# Patient Record
Sex: FEMALE | Race: WHITE | Employment: UNEMPLOYED | URBAN - METROPOLITAN AREA
[De-identification: names, ages, dates, MRNs, and addresses within clinical notes are randomized per-mention and may not be internally consistent; named-entity substitution may affect disease eponyms.]

---

## 2017-05-08 ENCOUNTER — TELEPHONE (OUTPATIENT)
Dept: OBSTETRICS AND GYNECOLOGY | Facility: CLINIC | Age: 47
End: 2017-05-08

## 2017-05-08 DIAGNOSIS — Z12.31 ENCOUNTER FOR SCREENING MAMMOGRAM FOR BREAST CANCER: Primary | ICD-10-CM

## 2017-05-08 NOTE — TELEPHONE ENCOUNTER
----- Message from Zunilda Voss sent at 5/8/2017  1:08 PM CDT -----  Contact: pt- [phone]  Pt would like a wellness exam and Mammo on the same day

## 2017-05-08 NOTE — TELEPHONE ENCOUNTER
appt made on 05/18/2017 with you for annual, she would like her mammo done the same day, needs mammo ordered please tdf

## 2017-05-17 ENCOUNTER — TELEPHONE (OUTPATIENT)
Dept: OBSTETRICS AND GYNECOLOGY | Facility: CLINIC | Age: 47
End: 2017-05-17

## 2017-05-17 NOTE — TELEPHONE ENCOUNTER
----- Message from Meka Carey sent at 5/17/2017  9:14 AM CDT -----  Contact: pt  Pt is returning missed call - please call again

## 2017-12-18 ENCOUNTER — TELEPHONE (OUTPATIENT)
Dept: OBSTETRICS AND GYNECOLOGY | Facility: CLINIC | Age: 47
End: 2017-12-18

## 2017-12-18 NOTE — TELEPHONE ENCOUNTER
Left a message for the patient informing her that the midwives are not seeing any GYN patient's so she can contact the appointment desk at [phone] and they can get her scheduled with one of the Doctor's or NP.

## 2017-12-18 NOTE — TELEPHONE ENCOUNTER
----- Message from Ivelisse Musa sent at 12/18/2017  1:02 PM CST -----  Contact: Pt  Pt called and stated she needed to speak to the nurse. She stated that she needs to be squeezed in to the schedule as soon as possible for her annual  pap smear.  She can be reached at [phone].    Thanks,  TF

## 2018-04-09 ENCOUNTER — OFFICE VISIT (OUTPATIENT)
Dept: OBSTETRICS AND GYNECOLOGY | Facility: CLINIC | Age: 48
End: 2018-04-09
Payer: MEDICAID

## 2018-04-09 ENCOUNTER — TELEPHONE (OUTPATIENT)
Dept: OBSTETRICS AND GYNECOLOGY | Facility: CLINIC | Age: 48
End: 2018-04-09

## 2018-04-09 VITALS
WEIGHT: 155.88 LBS | SYSTOLIC BLOOD PRESSURE: 102 MMHG | BODY MASS INDEX: 25.05 KG/M2 | HEIGHT: 66 IN | DIASTOLIC BLOOD PRESSURE: 78 MMHG

## 2018-04-09 DIAGNOSIS — N89.8 VAGINAL LESION: Primary | ICD-10-CM

## 2018-04-09 PROCEDURE — 99213 OFFICE O/P EST LOW 20 MIN: CPT | Mod: PBBFAC | Performed by: NURSE PRACTITIONER

## 2018-04-09 PROCEDURE — 87529 HSV DNA AMP PROBE: CPT

## 2018-04-09 PROCEDURE — 99999 PR PBB SHADOW E&M-EST. PATIENT-LVL III: CPT | Mod: PBBFAC,,, | Performed by: NURSE PRACTITIONER

## 2018-04-09 PROCEDURE — 99203 OFFICE O/P NEW LOW 30 MIN: CPT | Mod: S$PBB,,, | Performed by: NURSE PRACTITIONER

## 2018-04-09 RX ORDER — NYSTATIN AND TRIAMCINOLONE ACETONIDE 100000; 1 [USP'U]/G; MG/G
CREAM TOPICAL
Qty: 30 G | Refills: 1 | Status: SHIPPED | OUTPATIENT
Start: 2018-04-09 | End: 2018-05-21 | Stop reason: ALTCHOICE

## 2018-04-09 RX ORDER — ERGOCALCIFEROL 1.25 MG/1
CAPSULE ORAL DAILY
COMMUNITY
Start: 2017-10-20 | End: 2018-10-20

## 2018-04-09 NOTE — TELEPHONE ENCOUNTER
----- Message from Carrie Leonard sent at 4/9/2018  9:14 AM CDT -----  Contact: pt  States she would like to see Ms Toya Calvert tomorrow afternoon. Nothing coming up on the schedule. Please call pt at [phone]. Thank you

## 2018-04-09 NOTE — PROGRESS NOTES
"CC: Vaginal irritation    Elysia Cadena is a 47 y.o. female  presents for vaginal irritation. Patient reports that she has had vaginal irritation and burning for 7 weeks.    History reviewed. No pertinent past medical history.  Past Surgical History:   Procedure Laterality Date     SECTION       Social History     Social History    Marital status:      Spouse name: N/A    Number of children: N/A    Years of education: N/A     Occupational History    Not on file.     Social History Main Topics    Smoking status: Current Every Day Smoker     Types: Cigarettes    Smokeless tobacco: Current User      Comment: a pack in a couple days     Alcohol use No    Drug use: No    Sexual activity: Yes     Partners: Male     Other Topics Concern    Not on file     Social History Narrative    No narrative on file     History reviewed. No pertinent family history.  OB History      Para Term  AB Living    1 1 1     1    SAB TAB Ectopic Multiple Live Births                       /78 (BP Location: Right arm, Patient Position: Sitting, BP Method: Medium (Manual))   Ht 5' 6" (1.676 m)   Wt 70.7 kg (155 lb 13.8 oz)   LMP 2018   BMI 25.16 kg/m²       ROS:  GENERAL: Denies weight gain or weight loss. Feeling well overall.   CHEST: Denies chest pain or shortness of breath.   CARDIOVASCULAR: Denies palpitations or left sided chest pain.   ABDOMEN: No abdominal pain, constipation, diarrhea, nausea, vomiting or rectal bleeding.   URINARY: No frequency, dysuria, hematuria, or burning on urination.  REPRODUCTIVE: See HPI.       PHYSICAL EXAM:  APPEARANCE: Well nourished, well developed, in no acute distress.  PELVIC: External genitalia, erythema and small blisters clitoral mittal. Vagina moist and well rugated without lesions or discharge.      1. Vaginal lesion  Herpes simplex virus culture     PLAN:  HSV cx  Mycolog cream    Patient was counseled today on A.C.S. Pap guidelines and " recommendations for yearly pelvic exams, mammograms and monthly self breast exams; to see her PCP for other health maintenance.

## 2018-04-10 DIAGNOSIS — N89.8 VAGINAL LESION: Primary | ICD-10-CM

## 2018-04-11 ENCOUNTER — PATIENT MESSAGE (OUTPATIENT)
Dept: OBSTETRICS AND GYNECOLOGY | Facility: CLINIC | Age: 48
End: 2018-04-11

## 2018-04-12 LAB
HSV1 DNA SPEC QL NAA+PROBE: POSITIVE
HSV2 DNA SPEC QL NAA+PROBE: NEGATIVE
SPECIMEN SOURCE: ABNORMAL

## 2018-04-13 ENCOUNTER — OFFICE VISIT (OUTPATIENT)
Dept: OBSTETRICS AND GYNECOLOGY | Facility: CLINIC | Age: 48
End: 2018-04-13
Payer: MEDICAID

## 2018-04-13 ENCOUNTER — TELEPHONE (OUTPATIENT)
Dept: OBSTETRICS AND GYNECOLOGY | Facility: CLINIC | Age: 48
End: 2018-04-13

## 2018-04-13 VITALS
HEIGHT: 66 IN | SYSTOLIC BLOOD PRESSURE: 106 MMHG | DIASTOLIC BLOOD PRESSURE: 72 MMHG | BODY MASS INDEX: 25.12 KG/M2 | WEIGHT: 156.31 LBS

## 2018-04-13 DIAGNOSIS — L02.419 CELLULITIS AND ABSCESS OF LEG: ICD-10-CM

## 2018-04-13 DIAGNOSIS — N89.8 VAGINAL LESION: Primary | ICD-10-CM

## 2018-04-13 DIAGNOSIS — L03.119 CELLULITIS AND ABSCESS OF LEG: ICD-10-CM

## 2018-04-13 PROCEDURE — 99213 OFFICE O/P EST LOW 20 MIN: CPT | Mod: S$PBB,,, | Performed by: NURSE PRACTITIONER

## 2018-04-13 PROCEDURE — 99999 PR PBB SHADOW E&M-EST. PATIENT-LVL III: CPT | Mod: PBBFAC,,, | Performed by: NURSE PRACTITIONER

## 2018-04-13 PROCEDURE — 99213 OFFICE O/P EST LOW 20 MIN: CPT | Mod: PBBFAC | Performed by: NURSE PRACTITIONER

## 2018-04-13 RX ORDER — MUPIROCIN CALCIUM 20 MG/G
CREAM TOPICAL
Qty: 30 G | Refills: 0 | Status: SHIPPED | OUTPATIENT
Start: 2018-04-13 | End: 2018-05-21 | Stop reason: ALTCHOICE

## 2018-04-13 RX ORDER — VALACYCLOVIR HYDROCHLORIDE 500 MG/1
TABLET, FILM COATED ORAL
Qty: 30 TABLET | Refills: 12 | Status: SHIPPED | OUTPATIENT
Start: 2018-04-13

## 2018-04-13 RX ORDER — SULFAMETHOXAZOLE AND TRIMETHOPRIM 800; 160 MG/1; MG/1
1 TABLET ORAL 2 TIMES DAILY
Qty: 10 TABLET | Refills: 0 | Status: SHIPPED | OUTPATIENT
Start: 2018-04-13 | End: 2018-04-18

## 2018-04-13 RX ORDER — VALACYCLOVIR HYDROCHLORIDE 1 G/1
1000 TABLET, FILM COATED ORAL 2 TIMES DAILY
Qty: 20 TABLET | Refills: 0 | Status: SHIPPED | OUTPATIENT
Start: 2018-04-13 | End: 2018-04-23

## 2018-04-13 NOTE — PROGRESS NOTES
"CC: Discuss HSV cx results    Elysia Cadena is a 47 y.o. female  presents to discuss HSV results. Patient was seen for vaginal irritation and HSV cx positive for HSV I.  LMP: Patient's last menstrual period was 2018.. Patient reports that she has a rash on the back of her leg and " is concerned that it is from HSV".    History reviewed. No pertinent past medical history.  Past Surgical History:   Procedure Laterality Date     SECTION       Social History     Social History    Marital status:      Spouse name: N/A    Number of children: N/A    Years of education: N/A     Occupational History    Not on file.     Social History Main Topics    Smoking status: Current Every Day Smoker     Types: Cigarettes    Smokeless tobacco: Current User      Comment: a pack in a couple days     Alcohol use No    Drug use: No    Sexual activity: Yes     Partners: Male     Other Topics Concern    Not on file     Social History Narrative    No narrative on file     History reviewed. No pertinent family history.  OB History      Para Term  AB Living    1 1 1     1    SAB TAB Ectopic Multiple Live Births                       /72   Ht 5' 6" (1.676 m)   Wt 70.9 kg (156 lb 4.9 oz)   LMP 2018   BMI 25.23 kg/m²       ROS:  GENERAL: Denies weight gain or weight loss. Feeling well overall.   SKIN: HPI  HEAD: Denies head injury or headache.   NODES: Denies enlarged lymph nodes.   CHEST: Denies chest pain or shortness of breath.   CARDIOVASCULAR: Denies palpitations or left sided chest pain.   ABDOMEN: No abdominal pain, constipation, diarrhea, nausea, vomiting or rectal bleeding.   URINARY: No frequency, dysuria, hematuria, or burning on urination.  REPRODUCTIVE: See HPI.   BREASTS: The patient performs breast self-examination and denies pain, lumps, or nipple discharge.   HEMATOLOGIC: No easy bruisability or excessive bleeding.   MUSCULOSKELETAL: Denies joint pain or swelling. "   NEUROLOGIC: Denies syncope or weakness.   PSYCHIATRIC: +anxiety.    PHYSICAL EXAM:  APPEARANCE: Well nourished, well developed, in no acute distress.  AFFECT: WNL, alert and oriented x 3  SKIN: Small round indurated cellulitis back right leg.       1. Vaginal lesion     2. Cellulitis and abscess of leg      PLAN:  I spent 20 minutes with this patient explaining HSV I and II and treatment options. Will start suppressive medication.  Bactrim and Bactroban for cellulitis.         Patient was counseled today on A.C.S. Pap guidelines and recommendations for yearly pelvic exams, mammograms and monthly self breast exams; to see her PCP for other health maintenance.

## 2018-04-13 NOTE — TELEPHONE ENCOUNTER
Spoke with pt. Let pt know that I would let the provider know about the prescription and I would call her back once we can call something else in for her.

## 2018-04-13 NOTE — TELEPHONE ENCOUNTER
----- Message from Ashley Solis sent at 4/13/2018  2:36 PM CDT -----  Contact: Pt  Pt request call back states her medication is $400.00. States she needs something else called in. .[phone] (home)

## 2018-04-13 NOTE — TELEPHONE ENCOUNTER
Pt states she has a rash on inner thigh. Demands an appt, scheduled for 4/13 1:45 PM. Patient verbalized understanding

## 2018-04-13 NOTE — TELEPHONE ENCOUNTER
Spoke with patient. Advised pt to use antibiotics and not the cream. Also, advised the pt to follow up with her PCP if the antibiotics do not work. Patient verbalized understanding.

## 2018-04-13 NOTE — TELEPHONE ENCOUNTER
----- Message from Court Hernandez sent at 4/13/2018  9:11 AM CDT -----  Contact: patient  States she need to be seen today for a rash on her leg. Please call patient today [facility] @ [phone]. Thanks, melissa

## 2018-04-13 NOTE — TELEPHONE ENCOUNTER
----- Message from Carla Thomas NP sent at 4/13/2018  3:13 PM CDT -----  Contact: Pt  Do not use cream, use antibiotics. Needs to see PCP if no resolve to treatment  ----- Message -----  From: Raegan Viera MA  Sent: 4/13/2018   2:43 PM  To: Carla Thomas NP        ----- Message -----  From: Ashley Solis  Sent: 4/13/2018   2:36 PM  To: Martha Aguirre Staff    Pt request call back states her medication is $400.00. States she needs something else called in. .[phone] (home)

## 2018-05-17 ENCOUNTER — TELEPHONE (OUTPATIENT)
Dept: OBSTETRICS AND GYNECOLOGY | Facility: CLINIC | Age: 48
End: 2018-05-17

## 2018-05-17 NOTE — TELEPHONE ENCOUNTER
Pt called in requesting an appt specifically with Carla Thomas, in regards to experiencing the same symptoms from her last visit on 4/13/18. Informed pt that provider will not be back into the office until Monday. Pt agreed to be scheduled for Monday.

## 2018-05-17 NOTE — TELEPHONE ENCOUNTER
Pt had questions in regards to pt's  concern with herpes exposure. Advised pt that her  can go to his pcp for bloodwork. Patient verbalized understanding

## 2018-05-17 NOTE — TELEPHONE ENCOUNTER
----- Message from Irene Chavis sent at 5/17/2018  2:07 PM CDT -----  Contact: pt   pt requesting callback. will elaborate(urgent per patient) ..[phone] (home)

## 2018-05-22 ENCOUNTER — OFFICE VISIT (OUTPATIENT)
Dept: OBSTETRICS AND GYNECOLOGY | Facility: CLINIC | Age: 48
End: 2018-05-22
Payer: MEDICAID

## 2018-05-22 VITALS
DIASTOLIC BLOOD PRESSURE: 84 MMHG | BODY MASS INDEX: 24.48 KG/M2 | SYSTOLIC BLOOD PRESSURE: 132 MMHG | HEIGHT: 66 IN | WEIGHT: 152.31 LBS

## 2018-05-22 DIAGNOSIS — B00.9 HSV-2 INFECTION: Primary | ICD-10-CM

## 2018-05-22 PROCEDURE — 99212 OFFICE O/P EST SF 10 MIN: CPT | Mod: PBBFAC | Performed by: NURSE PRACTITIONER

## 2018-05-22 PROCEDURE — 99999 PR PBB SHADOW E&M-EST. PATIENT-LVL II: CPT | Mod: PBBFAC,,, | Performed by: NURSE PRACTITIONER

## 2018-05-22 PROCEDURE — 99213 OFFICE O/P EST LOW 20 MIN: CPT | Mod: S$PBB,,, | Performed by: NURSE PRACTITIONER

## 2018-05-24 NOTE — PROGRESS NOTES
"CC: Review cx results    Elysia Cadena is a 47 y.o. female  presents with her  to review HSV cx results. Patient requesting that " NP discuss her results and the implication of HSV".     History reviewed. No pertinent past medical history.  Past Surgical History:   Procedure Laterality Date     SECTION       Social History     Social History    Marital status:      Spouse name: N/A    Number of children: N/A    Years of education: N/A     Occupational History    Not on file.     Social History Main Topics    Smoking status: Current Every Day Smoker     Packs/day: 0.50     Types: Cigarettes    Smokeless tobacco: Current User      Comment: a pack in a couple days     Alcohol use No    Drug use: No    Sexual activity: Yes     Partners: Male     Other Topics Concern    Not on file     Social History Narrative    No narrative on file     History reviewed. No pertinent family history.  OB History      Para Term  AB Living    1 1 1     1    SAB TAB Ectopic Multiple Live Births                       /84   Ht 5' 6" (1.676 m)   Wt 69.1 kg (152 lb 5.4 oz)   LMP 2018   BMI 24.59 kg/m²       ROS:  GENERAL: Denies weight gain or weight loss. Feeling well overall.   SKIN: Denies rash or lesions.   HEAD: Denies head injury or headache.   NODES: Denies enlarged lymph nodes.   CHEST: Denies chest pain or shortness of breath.   CARDIOVASCULAR: Denies palpitations or left sided chest pain.   ABDOMEN: No abdominal pain, constipation, diarrhea, nausea, vomiting or rectal bleeding.   URINARY: No frequency, dysuria, hematuria, or burning on urination.  REPRODUCTIVE: See HPI.   BREASTS: The patient performs breast self-examination and denies pain, lumps, or nipple discharge.   HEMATOLOGIC: No easy bruisability or excessive bleeding.   MUSCULOSKELETAL: Denies joint pain or swelling.   NEUROLOGIC: Denies syncope or weakness.   PSYCHIATRIC: Denies depression, anxiety or mood " swings.    PHYSICAL EXAM:  Deferred ( see HPI)       PLAN:  I spent 90 minutes with this patient and  explaining HSV I cx results. Patient and  fought the entire visit.

## 2018-05-29 ENCOUNTER — PATIENT MESSAGE (OUTPATIENT)
Dept: OBSTETRICS AND GYNECOLOGY | Facility: CLINIC | Age: 48
End: 2018-05-29

## 2018-05-29 ENCOUNTER — TELEPHONE (OUTPATIENT)
Dept: OBSTETRICS AND GYNECOLOGY | Facility: CLINIC | Age: 48
End: 2018-05-29

## 2018-05-29 DIAGNOSIS — B00.9 HSV INFECTION: Primary | ICD-10-CM

## 2018-05-29 NOTE — TELEPHONE ENCOUNTER
----- Message from Tyler Henriquez sent at 5/29/2018 12:09 PM CDT -----  Contact: Elysia [phone]  Patient wants a nurse to contact her but would not disclose what she wanted to leave a msg about. Patient states that Dr. Thomas knows what she is calling about. Please contact pt @ [phone]

## 2018-05-30 ENCOUNTER — LAB VISIT (OUTPATIENT)
Dept: LAB | Facility: HOSPITAL | Age: 48
End: 2018-05-30
Attending: NURSE PRACTITIONER
Payer: MEDICAID

## 2018-05-30 DIAGNOSIS — B00.9 HSV INFECTION: ICD-10-CM

## 2018-05-30 PROCEDURE — 36415 COLL VENOUS BLD VENIPUNCTURE: CPT

## 2018-05-30 PROCEDURE — 86696 HERPES SIMPLEX TYPE 2 TEST: CPT

## 2018-05-30 PROCEDURE — 86694 HERPES SIMPLEX NES ANTBDY: CPT

## 2018-05-30 PROCEDURE — 86694 HERPES SIMPLEX NES ANTBDY: CPT | Mod: 59

## 2018-05-31 ENCOUNTER — TELEPHONE (OUTPATIENT)
Dept: OBSTETRICS AND GYNECOLOGY | Facility: CLINIC | Age: 48
End: 2018-05-31

## 2018-05-31 LAB — HSV AB, IGM BY EIA: REACTIVE

## 2018-05-31 NOTE — TELEPHONE ENCOUNTER
----- Message from Alivia Puckett sent at 5/31/2018  1:48 PM CDT -----  Contact: Pt.   Pt is calling regarding test result. .[phone] (home)

## 2018-06-01 ENCOUNTER — TELEPHONE (OUTPATIENT)
Dept: OBSTETRICS AND GYNECOLOGY | Facility: CLINIC | Age: 48
End: 2018-06-01

## 2018-06-01 LAB — HSV AB, IGM BY IFA: POSITIVE

## 2018-06-01 NOTE — TELEPHONE ENCOUNTER
----- Message from Carla Thomas NP sent at 6/1/2018  8:59 AM CDT -----  Please call patient and inform her that results are positive for recent infection. She was exposed in the past 2-3 months.

## 2018-06-01 NOTE — PROGRESS NOTES
Please call patient and inform her that results are positive for recent infection. She was exposed in the past 2-3 months.

## 2018-06-04 ENCOUNTER — TELEPHONE (OUTPATIENT)
Dept: OBSTETRICS AND GYNECOLOGY | Facility: CLINIC | Age: 48
End: 2018-06-04

## 2018-06-04 NOTE — TELEPHONE ENCOUNTER
----- Message from Carla Thomas NP sent at 6/2/2018 10:01 AM CDT -----  Please print out her HSV labs and call her. She needs to pick them up at the office.

## 2018-06-04 NOTE — TELEPHONE ENCOUNTER
Spoke with pt informed that results will be available for  at the . Patient verbalized understanding

## 2018-06-05 LAB
HSV1 IGG SERPL QL IA: ABNORMAL
HSV2 IGG SERPL QL IA: NEGATIVE

## 2021-07-01 ENCOUNTER — PATIENT MESSAGE (OUTPATIENT)
Dept: ADMINISTRATIVE | Facility: OTHER | Age: 51
End: 2021-07-01